# Patient Record
Sex: MALE | Race: WHITE | NOT HISPANIC OR LATINO | Employment: FULL TIME | URBAN - METROPOLITAN AREA
[De-identification: names, ages, dates, MRNs, and addresses within clinical notes are randomized per-mention and may not be internally consistent; named-entity substitution may affect disease eponyms.]

---

## 2023-06-12 ENCOUNTER — HOSPITAL ENCOUNTER (OUTPATIENT)
Dept: DATA CONVERSION | Facility: HOSPITAL | Age: 68
End: 2023-06-12
Attending: NEUROLOGICAL SURGERY | Admitting: NEUROLOGICAL SURGERY

## 2023-06-12 DIAGNOSIS — D33.3 BENIGN NEOPLASM OF CRANIAL NERVES (MULTI): ICD-10-CM

## 2023-06-12 DIAGNOSIS — C61 MALIGNANT NEOPLASM OF PROSTATE (MULTI): ICD-10-CM

## 2023-12-12 ENCOUNTER — TELEPHONE (OUTPATIENT)
Dept: RADIATION ONCOLOGY | Facility: HOSPITAL | Age: 68
End: 2023-12-12
Payer: COMMERCIAL

## 2023-12-13 ENCOUNTER — HOSPITAL ENCOUNTER (OUTPATIENT)
Dept: RADIOLOGY | Facility: HOSPITAL | Age: 68
Discharge: HOME | End: 2023-12-13
Payer: COMMERCIAL

## 2023-12-13 ENCOUNTER — HOSPITAL ENCOUNTER (OUTPATIENT)
Dept: RADIATION ONCOLOGY | Facility: HOSPITAL | Age: 68
Setting detail: RADIATION/ONCOLOGY SERIES
Discharge: HOME | End: 2023-12-13
Payer: COMMERCIAL

## 2023-12-13 VITALS
TEMPERATURE: 97.7 F | WEIGHT: 169.75 LBS | SYSTOLIC BLOOD PRESSURE: 160 MMHG | OXYGEN SATURATION: 98 % | RESPIRATION RATE: 18 BRPM | DIASTOLIC BLOOD PRESSURE: 93 MMHG | HEART RATE: 61 BPM | BODY MASS INDEX: 25.14 KG/M2 | HEIGHT: 69 IN

## 2023-12-13 DIAGNOSIS — D33.3 BENIGN NEOPLASM OF CRANIAL NERVES (MULTI): ICD-10-CM

## 2023-12-13 DIAGNOSIS — D33.3 SCHWANNOMA OF CRANIAL NERVE (MULTI): Primary | ICD-10-CM

## 2023-12-13 PROCEDURE — 2550000001 HC RX 255 CONTRASTS: Performed by: STUDENT IN AN ORGANIZED HEALTH CARE EDUCATION/TRAINING PROGRAM

## 2023-12-13 PROCEDURE — A9575 INJ GADOTERATE MEGLUMI 0.1ML: HCPCS | Performed by: STUDENT IN AN ORGANIZED HEALTH CARE EDUCATION/TRAINING PROGRAM

## 2023-12-13 PROCEDURE — 70553 MRI BRAIN STEM W/O & W/DYE: CPT

## 2023-12-13 PROCEDURE — 99214 OFFICE O/P EST MOD 30 MIN: CPT | Performed by: NURSE PRACTITIONER

## 2023-12-13 PROCEDURE — 70553 MRI BRAIN STEM W/O & W/DYE: CPT | Performed by: RADIOLOGY

## 2023-12-13 RX ORDER — GADOTERATE MEGLUMINE 376.9 MG/ML
15 INJECTION INTRAVENOUS
Status: COMPLETED | OUTPATIENT
Start: 2023-12-13 | End: 2023-12-13

## 2023-12-13 RX ADMIN — GADOTERATE MEGLUMINE 15 ML: 376.9 INJECTION INTRAVENOUS at 14:42

## 2023-12-13 ASSESSMENT — PATIENT HEALTH QUESTIONNAIRE - PHQ9
SUM OF ALL RESPONSES TO PHQ9 QUESTIONS 1 AND 2: 0
1. LITTLE INTEREST OR PLEASURE IN DOING THINGS: NOT AT ALL
2. FEELING DOWN, DEPRESSED OR HOPELESS: NOT AT ALL

## 2023-12-13 ASSESSMENT — COLUMBIA-SUICIDE SEVERITY RATING SCALE - C-SSRS
2. HAVE YOU ACTUALLY HAD ANY THOUGHTS OF KILLING YOURSELF?: NO
1. IN THE PAST MONTH, HAVE YOU WISHED YOU WERE DEAD OR WISHED YOU COULD GO TO SLEEP AND NOT WAKE UP?: NO
6. HAVE YOU EVER DONE ANYTHING, STARTED TO DO ANYTHING, OR PREPARED TO DO ANYTHING TO END YOUR LIFE?: NO

## 2023-12-13 ASSESSMENT — ENCOUNTER SYMPTOMS
LOSS OF SENSATION IN FEET: 0
DEPRESSION: 0
OCCASIONAL FEELINGS OF UNSTEADINESS: 0

## 2023-12-13 NOTE — PROGRESS NOTES
Radiation Oncology Follow-Up    Patient Name:  Devon Smith  MRN:  [id number]  :  1955    Referring Provider: No ref. provider found  Primary Care Provider: Lawson Garcia MD  Care Team: Patient Care Team:  Lawson Garcia MD as PCP - General (Family Medicine)  Keli MAY MD as PCP - [facility] PCP    Date of Service: 2023   Treatment Synopsis:    68-year-old gentleman with a history of adenocarcinoma of the prostate, clinical stage T1c N0 M0, group II, Citlaly score 6 (3+3), pretreatment prostate-specific  antigen of 4.73 ng/mL. He is status post prostate interstitial brachytherapy utilizing palladium-103, followed by SpaceOAR device placement on 2016. He received a total of 75 palladium-103 seeds for a prescribed dose of 125 Gy.      23: Consultation with radiation for decreased hearing on his right for a few years. On his audiology exam he was found to have asymmetric decrease in hearing. On further testing with MRI - He was found to have a right acoustic neuroma. He had one short  episode of tinnitus , currently no tinnitus. He denied vestibular symptoms, CN 5 or 7 symptoms, denied ataxia or headaches.     23: Gamma knife SRS to right acoustic neuroma consisting of a total dose of 12.5 Gy.    SUBJECTIVE  History of Present Illness:   Devon Smith is here today for routine follow up and review of MRI of brain to evaluate response to treatment s/p gamma knife to right acoustic neuroma. He says he is doing very well.  He has right sided hearing loss and has been fitted with a hearing aid that helps somewhat.  No difficulty swallowing. No new neurologic issues.  Follows routinely with his PCP at [facility] for annual PSA.  Denies headaches, fever, chills, cough, SOB, chest pain, N/V.  MRI today stable - no evidence of growth of neuroma.     Review of Systems:    Review of Systems   All other systems reviewed and are negative.    Performance Status:   The Karnofsky  performance scale today is 100, Fully active, able to carry on all pre-disease performed without restriction (ECOG equivalent 0).      Physical Exam  Constitutional:       Appearance: Normal appearance.   HENT:      Head: Normocephalic and atraumatic.      Right Ear: External ear normal.      Left Ear: External ear normal.      Nose: Nose normal.      Mouth/Throat:      Mouth: Mucous membranes are moist.      Pharynx: Oropharynx is clear.   Eyes:      Conjunctiva/sclera: Conjunctivae normal.      Pupils: Pupils are equal, round, and reactive to light.   Cardiovascular:      Rate and Rhythm: Normal rate and regular rhythm.      Heart sounds: Normal heart sounds.   Pulmonary:      Effort: Pulmonary effort is normal.      Breath sounds: Normal breath sounds.   Abdominal:      Palpations: Abdomen is soft.   Musculoskeletal:         General: No swelling. Normal range of motion.      Cervical back: Normal range of motion.   Lymphadenopathy:      Cervical: No cervical adenopathy.   Skin:     General: Skin is warm and dry.   Neurological:      General: No focal deficit present.      Mental Status: He is alert and oriented to person, place, and time.      Cranial Nerves: No cranial nerve deficit.      Sensory: No sensory deficit.      Motor: No weakness.      Coordination: Coordination normal.      Gait: Gait normal.   Psychiatric:         Mood and Affect: Mood normal.         Behavior: Behavior normal.       RESULTS:  MR brain w and wo IV contrast    Result Date: 12/13/2023  Interpreted By:  Heike Menendez, STUDY: MR BRAIN W AND WO IV CONTRAST;  12/13/2023 2:54 pm   INDICATION: Signs/Symptoms: Right sided hearing loss; s/p Gamma knife  D33.3: Acoustic neuroma.   COMPARISON: June 12, 2023 and May 8, 2023   ACCESSION NUMBER(S): [id number]   ORDERING CLINICIAN: DODIE STONE   TECHNIQUE: Axial T2, FLAIR, DWI, gradient echo T2 and  T1 weighted images of brain were acquired. Post contrast T1 weighted images were acquired  after administration of 15 mL Dotarem gadolinium based intravenous contrast.   FINDINGS: CSF Spaces: There is again an enhancing mass lesion centered within the right cerebellopontine angle cistern and extending into the internal auditory canal. There is inhomogeneous enhancement of the lesion, particularly the component within the cerebellopontine angle cistern similar to the previous examination. The mass currently measures approximately 1.8 x 0.9 cm in oblique axial dimensions, very similar from the previous exam. The gradient echo T2 weighted images demonstrate susceptibility artifact and the precontrast T1 weighted images demonstrate intrinsic hyperintensity within the mass which may be due to blood products.   There is again prominence of ventricles and sulci compatible with diffuse parenchymal volume loss.   Parenchyma: There is no diffusion restriction abnormality to suggest acute infarct.  There are scattered, nonspecific hyperintensities on FLAIR and T2 weighted imaging in the subcortical and periventricular white matter. Prominent perivascular spaces and/or remote lacunar infarcts are demonstrated in the basal ganglia and thalami. There is no abnormal parenchymal enhancement. There is no midline shift.   Paranasal Sinuses and Mastoids: There is opacification of a few inferior mastoid air cells, left greater than right. The visualized paranasal sinuses are clear.       1. Redemonstration of an enhancing mass in the right cerebellopontine angle cistern and internal auditory canal which is nonspecific but compatible with a schwannoma. It is very similar in size from June 12, 2023. 2. Mild white-matter changes are nonspecific but may represent small-vessel ischemic disease in a patient of this age.       MACRO: None   Signed by: Heike Menendez 12/13/2023 3:10 PM Dictation workstation:   [id number]       ASSESSMENT:  68 y.o. male with right acoustic neuroma s/p gamma knife SRS.  MRI reviewed with pt today  and stable.      PLAN:    FUV in 6 mo with MRI prior.  Call with any questions or concerns.     Tamie Cleveland CNP  [phone]

## 2024-06-18 ENCOUNTER — TELEPHONE (OUTPATIENT)
Dept: RADIATION ONCOLOGY | Facility: HOSPITAL | Age: 69
End: 2024-06-18
Payer: COMMERCIAL

## 2024-06-19 ENCOUNTER — HOSPITAL ENCOUNTER (OUTPATIENT)
Dept: RADIATION ONCOLOGY | Facility: HOSPITAL | Age: 69
Setting detail: RADIATION/ONCOLOGY SERIES
Discharge: HOME | End: 2024-06-19
Payer: COMMERCIAL

## 2024-06-19 ENCOUNTER — HOSPITAL ENCOUNTER (OUTPATIENT)
Dept: RADIOLOGY | Facility: HOSPITAL | Age: 69
Discharge: HOME | End: 2024-06-19
Payer: COMMERCIAL

## 2024-06-19 VITALS
HEIGHT: 69 IN | DIASTOLIC BLOOD PRESSURE: 83 MMHG | TEMPERATURE: 97.5 F | SYSTOLIC BLOOD PRESSURE: 156 MMHG | HEART RATE: 59 BPM | RESPIRATION RATE: 18 BRPM | BODY MASS INDEX: 25.4 KG/M2 | WEIGHT: 171.5 LBS | OXYGEN SATURATION: 99 %

## 2024-06-19 DIAGNOSIS — D33.3 SCHWANNOMA OF CRANIAL NERVE (MULTI): ICD-10-CM

## 2024-06-19 PROCEDURE — 99214 OFFICE O/P EST MOD 30 MIN: CPT | Performed by: NURSE PRACTITIONER

## 2024-06-19 PROCEDURE — 70553 MRI BRAIN STEM W/O & W/DYE: CPT | Performed by: RADIOLOGY

## 2024-06-19 PROCEDURE — A9575 INJ GADOTERATE MEGLUMI 0.1ML: HCPCS | Performed by: NURSE PRACTITIONER

## 2024-06-19 PROCEDURE — 2550000001 HC RX 255 CONTRASTS: Performed by: NURSE PRACTITIONER

## 2024-06-19 PROCEDURE — 70553 MRI BRAIN STEM W/O & W/DYE: CPT

## 2024-06-19 RX ORDER — GADOTERATE MEGLUMINE 376.9 MG/ML
15 INJECTION INTRAVENOUS
Status: COMPLETED | OUTPATIENT
Start: 2024-06-19 | End: 2024-06-19

## 2024-06-19 ASSESSMENT — ENCOUNTER SYMPTOMS
DEPRESSION: 0
OCCASIONAL FEELINGS OF UNSTEADINESS: 0
LOSS OF SENSATION IN FEET: 0

## 2024-06-19 ASSESSMENT — PATIENT HEALTH QUESTIONNAIRE - PHQ9
1. LITTLE INTEREST OR PLEASURE IN DOING THINGS: NOT AT ALL
2. FEELING DOWN, DEPRESSED OR HOPELESS: NOT AT ALL
SUM OF ALL RESPONSES TO PHQ9 QUESTIONS 1 AND 2: 0

## 2024-06-19 ASSESSMENT — COLUMBIA-SUICIDE SEVERITY RATING SCALE - C-SSRS
6. HAVE YOU EVER DONE ANYTHING, STARTED TO DO ANYTHING, OR PREPARED TO DO ANYTHING TO END YOUR LIFE?: NO
2. HAVE YOU ACTUALLY HAD ANY THOUGHTS OF KILLING YOURSELF?: NO
1. IN THE PAST MONTH, HAVE YOU WISHED YOU WERE DEAD OR WISHED YOU COULD GO TO SLEEP AND NOT WAKE UP?: NO

## 2024-06-19 NOTE — PROGRESS NOTES
Radiation Oncology Follow-Up    Patient Name:  Devon Smith  MRN:  [id number]  :  1955    Referring Provider: Karen Cleveland, APR*  Primary Care Provider: Lawson Garcia MD  Care Team: Patient Care Team:  Lawson Garcia MD as PCP - General (Family Medicine)  Lawson Garcia MD as PCP - [facility] PCP    Date of Service: 2024   Treatment Synopsis:    68-year-old gentleman with a history of adenocarcinoma of the prostate, clinical stage T1c N0 M0, group II, Ictlaly score 6 (3+3), pretreatment prostate-specific  antigen of 4.73 ng/mL. He is status post prostate interstitial brachytherapy utilizing palladium-103, followed by SpaceOAR device placement on 2016. He received a total of 75 palladium-103 seeds for a prescribed dose of 125 Gy.      23: Consultation with radiation for decreased hearing on his right for a few years. On his audiology exam he was found to have asymmetric decrease in hearing. On further testing with MRI - He was found to have a right acoustic neuroma. He had one short  episode of tinnitus , currently no tinnitus. He denied vestibular symptoms, CN 5 or 7 symptoms, denied ataxia or headaches.     23: Gamma knife SRS to right acoustic neuroma consisting of a total dose of 12.5 Gy.    SUBJECTIVE  History of Present Illness:   Devon Smith is here today for routine follow up and review of MRI of brain to evaluate response to treatment s/p gamma knife to right acoustic neuroma. He says he is doing very well.  He has right sided hearing loss and has been fitted with a hearing aid that helps somewhat.  He endorses mild tinnitus slightly worse but not bothersome. No difficulty swallowing. No new neurologic issues.  Follows routinely with his PCP at [facility] for annual PSA.  Denies headaches, fever, chills, cough, SOB, chest pain, N/V.  MRI today stable - no evidence of growth of neuroma.     Review of Systems:    Review of Systems   All other systems reviewed  and are negative.    Performance Status:   The Karnofsky performance scale today is 100, Fully active, able to carry on all pre-disease performed without restriction (ECOG equivalent 0).      Physical Exam  Constitutional:       Appearance: Normal appearance.   HENT:      Head: Normocephalic and atraumatic.      Right Ear: External ear normal.      Left Ear: External ear normal.      Nose: Nose normal.      Mouth/Throat:      Mouth: Mucous membranes are moist.      Pharynx: Oropharynx is clear.   Eyes:      Conjunctiva/sclera: Conjunctivae normal.      Pupils: Pupils are equal, round, and reactive to light.   Cardiovascular:      Rate and Rhythm: Normal rate and regular rhythm.      Heart sounds: Normal heart sounds.   Pulmonary:      Effort: Pulmonary effort is normal.      Breath sounds: Normal breath sounds.   Abdominal:      Palpations: Abdomen is soft.   Musculoskeletal:         General: No swelling. Normal range of motion.      Cervical back: Normal range of motion.   Lymphadenopathy:      Cervical: No cervical adenopathy.   Skin:     General: Skin is warm and dry.   Neurological:      General: No focal deficit present.      Mental Status: He is alert and oriented to person, place, and time.      Cranial Nerves: No cranial nerve deficit.      Sensory: No sensory deficit.      Motor: No weakness.      Coordination: Coordination normal.      Gait: Gait normal.   Psychiatric:         Mood and Affect: Mood normal.         Behavior: Behavior normal.       RESULTS:   MR brain w and wo IV contrast    Result Date: 6/19/2024  Interpreted By:  Elise Sandoval, STUDY: MR BRAIN W AND WO IV CONTRAST;  6/19/2024 3:07 pm   INDICATION: Signs/Symptoms:hx right acoustic schwannoma s/p gamma knife SRS. Evaluate response to treatment..   COMPARISON: MRI brain from 12/30/2023   ACCESSION NUMBER(S): [id number]   ORDERING CLINICIAN: ANDRES BARCLAY   TECHNIQUE: T2, FLAIR, DWI, gradient echo T2 and T1 weighted images of brain were  acquired without intravenous contrast administration. Precontrast high-resolution T1 weighted and T2 weighted images were acquired through the region of internal auditory canals. Post contrast T1 weighted images of the whole brain, high-resolution T1 coronal images through the internal auditory canals and fat saturated T1 axial images through the internal auditory canals were acquired after administration of 15 ML Dotarem gadolinium based intravenous contrast.   FINDINGS: CSF Spaces: The ventricles, sulci and basal cisterns are within normal limits.   Parenchyma: There is no diffusion restriction abnormality to suggest acute infarct.  There are mild scattered T2 and FLAIR hyperintensities within bilateral periventricular and subcortical white matter which are nonspecific and may reflect sequela of chronic small vessel ischemic change. There is no abnormal parenchymal enhancement.  There is no mass effect or midline shift.   IAC region:  There is an enhancing nodule within the right CP angle cistern which measures 16 x 7 mm in maximum transaxial dimensions, previously measured 17 x 10 mm in similar dimension. The overall size of the lesion is slightly decreased as compared to prior study. There is minimal central necrosis as before. It causes mild encroachment upon the right CP angle cistern without significant mass effect on the adjacent brain structures. There is no focus of abnormal enhancement within left internal auditory canals.  Bilateral inner ear structures demonstrate expected signal and postcontrast appearance.   Paranasal Sinuses and Mastoids: Visualized paranasal sinuses and mastoid air cells are unremarkable.       1.  Slight interval decrease in size of the right CP angle enhancing lesion as compared to prior study from 12/30/2023. 2.  No acute intracranial findings.   MACRO: None   Signed by: Elise Sandoval 6/19/2024 3:28 PM Dictation workstation:   [id number]      ASSESSMENT:  68 y.o. male with  right acoustic neuroma s/p gamma knife SRS.  MRI reviewed with pt today and stable.      PLAN:    FUV in 12 mo with MRI prior.  Call with any questions or concerns.     Tamie Cleveland CNP  [phone]

## 2025-06-13 ENCOUNTER — TELEPHONE (OUTPATIENT)
Dept: RADIATION ONCOLOGY | Facility: HOSPITAL | Age: 70
End: 2025-06-13
Payer: MEDICARE